# Patient Record
Sex: FEMALE | Race: BLACK OR AFRICAN AMERICAN | Employment: OTHER | RURAL
[De-identification: names, ages, dates, MRNs, and addresses within clinical notes are randomized per-mention and may not be internally consistent; named-entity substitution may affect disease eponyms.]

---

## 2017-01-25 ENCOUNTER — OFFICE VISIT (OUTPATIENT)
Dept: INTERNAL MEDICINE CLINIC | Age: 77
End: 2017-01-25

## 2017-01-25 VITALS
HEIGHT: 62 IN | HEART RATE: 86 BPM | BODY MASS INDEX: 31.28 KG/M2 | OXYGEN SATURATION: 99 % | RESPIRATION RATE: 16 BRPM | WEIGHT: 170 LBS | SYSTOLIC BLOOD PRESSURE: 137 MMHG | TEMPERATURE: 98 F | DIASTOLIC BLOOD PRESSURE: 83 MMHG

## 2017-01-25 DIAGNOSIS — I48.19 PERSISTENT ATRIAL FIBRILLATION (HCC): ICD-10-CM

## 2017-01-25 DIAGNOSIS — S09.90XD HEAD INJURY, SUBSEQUENT ENCOUNTER: Primary | ICD-10-CM

## 2017-01-25 DIAGNOSIS — J30.1 NON-SEASONAL ALLERGIC RHINITIS DUE TO POLLEN: ICD-10-CM

## 2017-01-25 DIAGNOSIS — M77.9 TENDONITIS: ICD-10-CM

## 2017-01-25 RX ORDER — DEXTROMETHORPHAN HYDROBROMIDE, GUAIFENESIN 5; 100 MG/5ML; MG/5ML
650 LIQUID ORAL
COMMUNITY

## 2017-01-25 RX ORDER — NAPROXEN 500 MG/1
500 TABLET ORAL 2 TIMES DAILY WITH MEALS
Qty: 60 TAB | Refills: 1 | Status: SHIPPED | OUTPATIENT
Start: 2017-01-25

## 2017-01-25 NOTE — MR AVS SNAPSHOT
Visit Information Date & Time Provider Department Dept. Phone Encounter #  
 1/25/2017  2:30 PM Vandana Novak MD [facility] [place] [id number] Follow-up Instructions Return in about 6 weeks (around 3/8/2017) for medicare annual.  
  
Upcoming Health Maintenance Date Due DTaP/Tdap/Td series (1 - Tdap) 1/27/1962 ZOSTER VACCINE AGE 60> 1/27/2001 GLAUCOMA SCREENING Q2Y 1/27/2006 OSTEOPOROSIS SCREENING (DEXA) 1/27/2006 Pneumococcal 65+ Low/Medium Risk (1 of 2 - PCV13) 1/27/2006 MEDICARE YEARLY EXAM 1/27/2006 INFLUENZA AGE 9 TO ADULT 8/1/2016 Allergies as of 1/25/2017  Review Complete On: 1/25/2017 By: Vandana Novak MD  
  
 Severity Noted Reaction Type Reactions Codeine High 12/13/2016    Nausea Only Current Immunizations  Never Reviewed No immunizations on file. Not reviewed this visit You Were Diagnosed With   
  
 Codes Comments Head injury, subsequent encounter    -  Primary ICD-10-CM: S09. 90XD ICD-9-CM: V58.89, 959.01 Persistent atrial fibrillation (HCC)     ICD-10-CM: I48.1 ICD-9-CM: 427.31 Tendonitis     ICD-10-CM: M77.9 ICD-9-CM: 726.90 Non-seasonal allergic rhinitis due to pollen     ICD-10-CM: J30.1 ICD-9-CM: 477.0 Vitals BP Pulse Temp Resp Height(growth percentile) Weight(growth percentile) 137/83 (BP 1 Location: Left arm, BP Patient Position: Sitting) 86 98 °F (36.7 °C) (Oral) 16 5' 2\" (1.575 m) 170 lb (77.1 kg) SpO2 BMI OB Status Smoking Status 99% 31.09 kg/m2 Hysterectomy Never Smoker BMI and BSA Data Body Mass Index Body Surface Area 31.09 kg/m 2 1.84 m 2 Preferred Pharmacy Pharmacy Name Phone [facility] [phone] [place] [address] [phone] Your Updated Medication List  
  
   
This list is accurate as of: 1/25/17  3:29 PM.  Always use your most recent med list.  
  
  
  
  
 acetaminophen 650 mg CR tablet Commonly known as:  TYLENOL Take 650 mg by mouth every six (6) hours as needed for Pain. amLODIPine 5 mg tablet Commonly known as:  [place] F [place] Take 5 mg by mouth daily. metoprolol tartrate 50 mg tablet Commonly known as:  LOPRESSOR Take  by mouth two (2) times a day. naproxen 500 mg tablet Commonly known as:  NAPROSYN Take 1 Tab by mouth two (2) times daily (with meals). As needed  
  
 nitroglycerin 0.4 mg SL tablet Commonly known as:  NITROSTAT  
by SubLINGual route every five (5) minutes as needed for Chest Pain. omeprazole 20 mg capsule Commonly known as:  PRILOSEC Take 1 Cap by mouth daily. rosuvastatin 20 mg tablet Commonly known as:  CRESTOR Take 20 mg by mouth nightly. sertraline 25 mg tablet Commonly known as:  ZOLOFT Take 1 Tab by mouth daily. Prescriptions Sent to Pharmacy Refills  
 naproxen (NAPROSYN) 500 mg tablet 1 Sig: Take 1 Tab by mouth two (2) times daily (with meals). As needed Class: Normal  
 Pharmacy: [place] [id number] [id number] #: [phone] Route: Oral  
  
Follow-up Instructions Return in about 6 weeks (around 3/8/2017) for medicare annual.  
  
  
Patient Instructions For your allergies use an over the counter preparation like Allegra or Zyrtec which is less sedating then Benedryl. In addition Flonase or Nasacort which are steroid nasal sprays are also available OTC. Call me if the symptoms continue or worsen. Introducing [facility]! [employer] introduces [employer] patient portal. Now you can access parts of your medical record, email your doctor's office, and request medication refills online. 1. In your internet browser, go to [email or web address] 2. Click on the First Time User? Click Here link in the Sign In box. You will see the New Member Sign Up page. 3. Enter your [employer] Access Code exactly as it appears below. You will not need to use this code after youve completed the sign-up process. If you do not sign up before the expiration date, you must request a new code. · [employer] Access Code: [email or web address] Expires: 3/13/2017  2:50 PM 
 
4. Enter the last four digits of your Social Security Number (xxxx) and Date of Birth (mm/dd/yyyy) as indicated and click Submit. You will be taken to the next sign-up page. 5. Create a [employer] ID. This will be your [employer] login ID and cannot be changed, so think of one that is secure and easy to remember. 6. Create a [employer] password. You can change your password at any time. 7. Enter your Password Reset Question and Answer. This can be used at a later time if you forget your password. 8. Enter your e-mail address. You will receive e-mail notification when new information is available in [address] 9. Click Sign Up. You can now view and download portions of your medical record. 10. Click the Download Summary menu link to download a portable copy of your medical information. If you have questions, please visit the Frequently Asked Questions section of the [employer] website. Remember, [employer] is NOT to be used for urgent needs. For medical emergencies, dial 911. Now available from your iPhone and Android! Please provide this summary of care documentation to your next provider. Your primary care clinician is listed as Iva Forrester. If you have any questions after today's visit, please call [phone].

## 2017-01-25 NOTE — PROGRESS NOTES
Chief Complaint   Patient presents with   Apoorva Ashton     I have reviewed the patient's medical history in detail and updated the computerized patient record. Health Maintenance reviewed. 1. Have you been to the ER, urgent care clinic since your last visit? Hospitalized since your last visit?no    2. Have you seen or consulted any other health care providers outside of the [address] [place] [address] since your last visit? Include any pap smears or colon screening.  no

## 2017-01-25 NOTE — PATIENT INSTRUCTIONS
For your allergies use an over the counter preparation like Allegra or Zyrtec which is less sedating then Benedryl. In addition Flonase or Nasacort which are steroid nasal sprays are also available OTC. Call me if the symptoms continue or worsen.

## 2017-01-25 NOTE — PROGRESS NOTES
HISTORY OF PRESENT ILLNESS  Alonzo Orta is a 77 y.o. female. Fall   The history is provided by the patient and relative. Incident onset: Dec 8th 2016. The fall occurred while walking (avoiding a moving vehicle). She fell from a height of 3 - 5 ft. She landed on concrete. The point of impact was the head, left elbow, right elbow, left hip and right hip. Pain location: no longer having pain. The patient is experiencing no pain. She was ambulatory at the scene. There was entrapment after the fall. Pertinent negatives include no headaches and no loss of consciousness. Both thumbs bothering her x 2 weeks or so no injury, says arthritis. Has made a good recovery from the incident. Past Medical History   Diagnosis Date    Alzheimer disease     Arthritis     Atrial fibrillation ([facility]     CAD (coronary artery disease)     Depression     GERD (gastroesophageal reflux disease)     H/O: hysterectomy     Hypertension     Persistent atrial fibrillation ([facility] 1/25/2017     Cards put her on Xarelto for Afib. Current Outpatient Prescriptions   Medication Sig Dispense Refill    acetaminophen (TYLENOL) 650 mg CR tablet Take 650 mg by mouth every six (6) hours as needed for Pain.  naproxen (NAPROSYN) 500 mg tablet Take 1 Tab by mouth two (2) times daily (with meals). As needed 60 Tab 1    amLODIPine (NORVASC) 5 mg tablet Take 5 mg by mouth daily.  nitroglycerin (NITROSTAT) 0.4 mg SL tablet by SubLINGual route every five (5) minutes as needed for Chest Pain.  rosuvastatin (CRESTOR) 20 mg tablet Take 20 mg by mouth nightly.  metoprolol tartrate (LOPRESSOR) 50 mg tablet Take  by mouth two (2) times a day.  sertraline (ZOLOFT) 25 mg tablet Take 1 Tab by mouth daily. 30 Tab 5    omeprazole (PRILOSEC) 20 mg capsule Take 1 Cap by mouth daily. 30 Cap 5     Allergies   Allergen Reactions    Codeine Nausea Only      will pay med expences, she wants to settle.     Social History     Social History    Marital status: UNKNOWN     Spouse name: N/A    Number of children: 9    Years of education: N/A     Occupational History    retired      Social History Main Topics    Smoking status: Never Smoker    Smokeless tobacco: Never Used    Alcohol use No    Drug use: No    Sexual activity: No     Other Topics Concern    Not on file     Social History Narrative    ** Merged History Encounter **                  Review of Systems   HENT: Positive for sore throat. Sore x several weeks   Gastrointestinal: Negative for blood in stool. Musculoskeletal: Positive for joint pain. Neurological: Negative for focal weakness, loss of consciousness and headaches. Physical Exam  Visit Vitals    /83 (BP 1 Location: Left arm, BP Patient Position: Sitting)    Pulse 86    Temp 98 °F (36.7 °C) (Oral)    Resp 16    Ht 5' 2\" (1.575 m)    Wt 170 lb (77.1 kg)    SpO2 99%    BMI 31.09 kg/m2       WDWN NAD  TM clear, throat wnl  Neck no adenopathy  Heart RRR no C/M/R  Lungs CTA  Abdo soft non tender  Ext No redness swelling or edema. Both thumbs metacarple are tender  +Judson's test  ASSESSMENT and PLAN  Encounter Diagnoses   Name Primary?  Head injury, subsequent encounter Yes    Persistent atrial fibrillation (HCC)     Tendonitis     Non-seasonal allergic rhinitis due to pollen      Orders Placed This Encounter    acetaminophen (TYLENOL) 650 mg CR tablet     Sig: Take 650 mg by mouth every six (6) hours as needed for Pain.  naproxen (NAPROSYN) 500 mg tablet     Sig: Take 1 Tab by mouth two (2) times daily (with meals). As needed     Dispense:  60 Tab     Refill:  1     If no better we can inject with steroids.   Follow-up Disposition:  Return in about 6 weeks (around 3/8/2017) for medicare annual.

## 2017-09-07 RX ORDER — SERTRALINE HYDROCHLORIDE 25 MG/1
TABLET, FILM COATED ORAL
Qty: 30 TAB | Refills: 5 | Status: SHIPPED | OUTPATIENT
Start: 2017-09-07

## 2017-09-07 NOTE — TELEPHONE ENCOUNTER
Last office visit:  1/25/17  Last filled:  Sertraline 12/13/16 #30 X 5 refills  No changes  No follow up scheduled

## 2017-11-21 ENCOUNTER — TELEPHONE (OUTPATIENT)
Dept: INTERNAL MEDICINE CLINIC | Age: 77
End: 2017-11-21

## 2018-07-12 ENCOUNTER — TELEPHONE (OUTPATIENT)
Dept: INTERNAL MEDICINE CLINIC | Age: 78
End: 2018-07-12